# Patient Record
Sex: FEMALE | Race: WHITE | Employment: OTHER | URBAN - METROPOLITAN AREA
[De-identification: names, ages, dates, MRNs, and addresses within clinical notes are randomized per-mention and may not be internally consistent; named-entity substitution may affect disease eponyms.]

---

## 2018-06-17 ENCOUNTER — HOSPITAL ENCOUNTER (EMERGENCY)
Facility: CLINIC | Age: 83
Discharge: HOME | End: 2018-06-17
Attending: FAMILY MEDICINE | Admitting: FAMILY MEDICINE
Payer: MEDICARE

## 2018-06-17 ENCOUNTER — APPOINTMENT (OUTPATIENT)
Dept: ULTRASOUND IMAGING | Facility: CLINIC | Age: 83
End: 2018-06-17
Attending: FAMILY MEDICINE
Payer: MEDICARE

## 2018-06-17 ENCOUNTER — APPOINTMENT (OUTPATIENT)
Dept: GENERAL RADIOLOGY | Facility: CLINIC | Age: 83
End: 2018-06-17
Attending: FAMILY MEDICINE
Payer: MEDICARE

## 2018-06-17 VITALS
BODY MASS INDEX: 27.4 KG/M2 | WEIGHT: 145 LBS | RESPIRATION RATE: 16 BRPM | OXYGEN SATURATION: 98 % | HEART RATE: 68 BPM | SYSTOLIC BLOOD PRESSURE: 123 MMHG | TEMPERATURE: 98.3 F | DIASTOLIC BLOOD PRESSURE: 55 MMHG

## 2018-06-17 DIAGNOSIS — S40.022A: ICD-10-CM

## 2018-06-17 LAB
ANION GAP SERPL CALCULATED.3IONS-SCNC: 10 MMOL/L (ref 3–14)
APTT PPP: 28 SEC (ref 22–37)
BASOPHILS # BLD AUTO: 0 10E9/L (ref 0–0.2)
BASOPHILS NFR BLD AUTO: 0.2 %
BUN SERPL-MCNC: 18 MG/DL (ref 7–30)
CALCIUM SERPL-MCNC: 8.2 MG/DL (ref 8.5–10.1)
CHLORIDE SERPL-SCNC: 101 MMOL/L (ref 94–109)
CO2 SERPL-SCNC: 24 MMOL/L (ref 20–32)
CREAT SERPL-MCNC: 0.67 MG/DL (ref 0.52–1.04)
CRP SERPL-MCNC: <2.9 MG/L (ref 0–8)
DIFFERENTIAL METHOD BLD: ABNORMAL
EOSINOPHIL # BLD AUTO: 0.1 10E9/L (ref 0–0.7)
EOSINOPHIL NFR BLD AUTO: 1.5 %
ERYTHROCYTE [DISTWIDTH] IN BLOOD BY AUTOMATED COUNT: 15.6 % (ref 10–15)
ERYTHROCYTE [SEDIMENTATION RATE] IN BLOOD BY WESTERGREN METHOD: 24 MM/H (ref 0–30)
GFR SERPL CREATININE-BSD FRML MDRD: 84 ML/MIN/1.7M2
GLUCOSE SERPL-MCNC: 94 MG/DL (ref 70–99)
HCT VFR BLD AUTO: 28.2 % (ref 35–47)
HGB BLD-MCNC: 9.6 G/DL (ref 11.7–15.7)
IMM GRANULOCYTES # BLD: 0 10E9/L (ref 0–0.4)
IMM GRANULOCYTES NFR BLD: 0.4 %
INR PPP: 0.98 (ref 0.86–1.14)
LYMPHOCYTES # BLD AUTO: 0.5 10E9/L (ref 0.8–5.3)
LYMPHOCYTES NFR BLD AUTO: 9.6 %
MCH RBC QN AUTO: 36.1 PG (ref 26.5–33)
MCHC RBC AUTO-ENTMCNC: 34 G/DL (ref 31.5–36.5)
MCV RBC AUTO: 106 FL (ref 78–100)
MONOCYTES # BLD AUTO: 0.8 10E9/L (ref 0–1.3)
MONOCYTES NFR BLD AUTO: 15.4 %
NEUTROPHILS # BLD AUTO: 3.9 10E9/L (ref 1.6–8.3)
NEUTROPHILS NFR BLD AUTO: 72.9 %
NRBC # BLD AUTO: 0 10*3/UL
NRBC BLD AUTO-RTO: 0 /100
PLATELET # BLD AUTO: 291 10E9/L (ref 150–450)
POTASSIUM SERPL-SCNC: 4.3 MMOL/L (ref 3.4–5.3)
RBC # BLD AUTO: 2.66 10E12/L (ref 3.8–5.2)
SODIUM SERPL-SCNC: 135 MMOL/L (ref 133–144)
WBC # BLD AUTO: 5.3 10E9/L (ref 4–11)

## 2018-06-17 PROCEDURE — 99282 EMERGENCY DEPT VISIT SF MDM: CPT | Mod: Z6 | Performed by: FAMILY MEDICINE

## 2018-06-17 PROCEDURE — 93971 EXTREMITY STUDY: CPT | Mod: LT

## 2018-06-17 PROCEDURE — 85652 RBC SED RATE AUTOMATED: CPT | Performed by: FAMILY MEDICINE

## 2018-06-17 PROCEDURE — 85730 THROMBOPLASTIN TIME PARTIAL: CPT | Performed by: FAMILY MEDICINE

## 2018-06-17 PROCEDURE — 86140 C-REACTIVE PROTEIN: CPT | Performed by: FAMILY MEDICINE

## 2018-06-17 PROCEDURE — 99285 EMERGENCY DEPT VISIT HI MDM: CPT | Mod: 25 | Performed by: FAMILY MEDICINE

## 2018-06-17 PROCEDURE — 85610 PROTHROMBIN TIME: CPT | Performed by: FAMILY MEDICINE

## 2018-06-17 PROCEDURE — 85025 COMPLETE CBC W/AUTO DIFF WBC: CPT | Performed by: FAMILY MEDICINE

## 2018-06-17 PROCEDURE — 73030 X-RAY EXAM OF SHOULDER: CPT | Mod: LT

## 2018-06-17 PROCEDURE — 80048 BASIC METABOLIC PNL TOTAL CA: CPT | Performed by: FAMILY MEDICINE

## 2018-06-17 NOTE — ED AVS SNAPSHOT
[facility], Emergency Department    [address]    [place] [address]    Phone:  [phone]    Fax:  [phone]                                       Sarah Knott   MRN: [id number]    Department:  [facility], Emergency Department   Date of Visit:  6/17/2018           Patient Information     Date Of Birth          6/6/1933        Your diagnoses for this visit were:     Hematoma of axilla, left, initial encounter        You were seen by Buddy Medina MD.        Discharge Instructions       Thank you for choosing [facility].     Please closely monitor for further symptoms. Return to the Emergency Department if you develop any new or worsening signs or symptoms.    If you received any opiate pain medications or sedatives during your visit, please do not drive for at least 8 hours.     Labs, cultures or final xray interpretations may still need to be reviewed.  We will call you if your plan of care needs to be changed.    Please follow up with your primary care physician or clinic 3-5 days for recheck.      Discharge References/Attachments     HEMATOMA (ENGLISH)      24 Hour Appointment Hotline       To make an appointment at any [place] clinic, call [phone] ([phone]). If you don't have a family doctor or clinic, we will help you find one. [place] clinics are conveniently located to serve the needs of you and your family.             Review of your medicines      Our records show that you are taking the medicines listed below. If these are incorrect, please call your family doctor or clinic.        Dose / Directions Last dose taken    acetaminophen 500 MG tablet   Commonly known as:  TYLENOL   Dose:  500-1000 mg        Take 1-2 tablets (500-1,000 mg) by mouth every 8 hours   Refills:  0        ASPIRIN PO   Dose:  81 mg        Take 81 mg by mouth daily   Refills:  0        bisacodyl 10 MG Suppository   Commonly known as:  DULCOLAX   Dose:  10 mg    Quantity:  30 suppository        Place 1 suppository (10 mg) rectally daily as needed   Refills:  0        calcium carbonate 500 MG tablet   Commonly known as:  OS-MARIUSZ 500 mg [place]. [place]   Dose:  1200 mg        Take 1,200 mg by mouth daily   Refills:  0        COENZYME Q-10 PO   Dose:  200 mg        Take 200 mg by mouth   Refills:  0        diltiazem 240 MG 24 hr capsule   Dose:  240 mg        Take 1 capsule (240 mg) by mouth daily   Refills:  0        DILTIAZEM HCL PO   Dose:  120 mg        Take 120 mg by mouth   Refills:  0        doxycycline 100 MG capsule   Commonly known as:  VIBRAMYCIN   Dose:  100 mg        Take 100 mg by mouth daily   Refills:  0        enoxaparin 40 MG/0.4ML injection   Commonly known as:  LOVENOX   Dose:  40 mg   Quantity:  1.2 mL        Inject 0.4 mLs (40 mg) Subcutaneous every 24 hours   Refills:  0        HYDROmorphone 2 MG tablet   Commonly known as:  DILAUDID   Dose:  2-4 mg   Quantity:  60 tablet        Take 1-2 tablets (2-4 mg) by mouth every 3 hours as needed   Refills:  0        * hydroxyurea 500 MG capsule CHEMO   Commonly known as:  HYDREA        Take by mouth daily Two tablets one day alternating with one the next day   Refills:  0        * hydroxyurea 500 MG capsule CHEMO   Commonly known as:  HYDREA   Dose:  500 mg        Take 1 capsule (500 mg) by mouth every other day   Refills:  0        hydrOXYzine 10 MG tablet   Commonly known as:  ATARAX   Dose:  10 mg   Quantity:  120 tablet        Take 1 tablet (10 mg) by mouth every 6 hours as needed for itching   Refills:  0        OMEGA-3 FISH OIL PO   Dose:  1 g        Take 1 g by mouth daily   Refills:  0        senna-docusate 8.6-50 MG per tablet   Commonly known as:  SENOKOT-S;PERICOLACE   Dose:  1-2 tablet   Quantity:  7 tablet        Take 1-2 tablets by mouth 2 times daily   Refills:  0        VITAMIN D (CHOLECALCIFEROL) PO   Dose:  2000 Units        Take 2,000 Units by mouth   Refills:  0        * Notice:  This list has 2  "medication(s) that are the same as other medications prescribed for you. Read the directions carefully, and ask your doctor or other care provider to review them with you.            Procedures and tests performed during your visit     Basic metabolic panel    CBC with platelets differential    CRP inflammation    Erythrocyte sedimentation rate auto    INR    PTT    US Upper Extremity Venous Duplex Left    XR Shoulder Left G/E 3 Views      Orders Needing Specimen Collection     None      Pending Results     Date and Time Order Name Status Description    6/17/2018 1538 XR Shoulder Left G/E 3 Views Preliminary             Pending Culture Results     No orders found from 6/15/2018 to 6/18/2018.            Pending Results Instructions     If you had any lab results that were not finalized at the time of your Discharge, you can call the ED Lab Result RN at [phone]. You will be contacted by this team for any positive Lab results or changes in treatment. The nurses are available 7 days a week from 10A to 6:30P.  You can leave a message 24 hours per day and they will return your call.        Thank you for choosing [facility]       Thank you for choosing [facility] for your care. Our goal is always to provide you with excellent care. Hearing back from our patients is one way we can continue to improve our services. Please take a few minutes to complete the written survey that you may receive in the mail after you visit with us. Thank you!        [facility] Information     [facility] lets you send messages to your doctor, view your test results, renew your prescriptions, schedule appointments and more. To sign up, go to [email or web address] . Click on \"Log in\" on the left side of the screen, which will take you to the Welcome page. Then click on \"Sign up Now\" on the right side of the page.     You will be asked to enter the access code listed below, as well as some personal information. Please follow the directions to create " your username and password.     Your access code is: [email or web address]  Expires: 9/15/2018  5:25 PM     Your access code will  in 90 days. If you need help or a new code, please call your [facility] clinic or [phone].        Care EveryWhere ID     This is your Care EveryWhere ID. This could be used by other organizations to access your [facility] medical records  [id number]        Equal Access to Services     [facility] CenterMARTINA : Hadii tim gillespie hadasho Soomaali, waaxda luqadaha, qaybta kaalmada adeegyada, cinda aden . So [facility] [phone].    ATENCIÓN: Si habla español, tiene a kumar disposición servicios gratuitos de asistencia lingüística. Llame al [phone].    We comply with applicable federal civil rights laws and [place] laws. We do not discriminate on the basis of race, color, national origin, age, disability, sex, sexual orientation, or gender identity.            After Visit Summary       This is your record. Keep this with you and show to your community pharmacist(s) and doctor(s) at your next visit.

## 2018-06-17 NOTE — ED AVS SNAPSHOT
[facility], [place], Emergency Department    [address]    [place] [address]    Phone:  [phone]    Fax:  [phone]                                       Sarah Knott   MRN: [id number]    Department:  [facility], Emergency Department   Date of Visit:  6/17/2018           After Visit Summary Signature Page     I have received my discharge instructions, and my questions have been answered. I have discussed any challenges I see with this plan with the nurse or doctor.    ..........................................................................................................................................  Patient/Patient Representative Signature      ..........................................................................................................................................  Patient Representative Print Name and Relationship to Patient    ..................................................               ................................................  Date                                            Time    ..........................................................................................................................................  Reviewed by Signature/Title    ...................................................              ..............................................  Date                                                            Time

## 2018-06-17 NOTE — ED PROVIDER NOTES
"  History     Chief Complaint   Patient presents with     Skin Check     Redness to left upper arm which started Friday, has been increasing towards chest since      HPI  Sarah Knott is a 85 year old female with history of Factor V Leiden, HTN, arthritis, heart murmurs, and lyme disease who was seen at [place] [facility] on 5/25/18, 3 weeks ago, for evaluation of a mechanical fall. Per chart review, patient slipped and fell out of her bed, landing on her left shoulder. She had a left shoulder X-Ray completed which \"did not show fracture dislocation, but did show severe degenerative changes suggesting possible avascular necrosis\". She then met with her Geriatric Medicine Doctor, Dr. Alejandra White, a few days later on 5/29/18. She was given a cortisone shot and had an MRI completed on that day that confirmed severe arthritis.    Today, patient presents for evaluation of worsening discoloration in her left upper arm and left-sided chest that started on Friday. She states the area of discoloration has been spreading down her arm and the area feels warm. She is unsure of any more trauma to the area since her fall. Regarding medications, she regularly takes baby aspirin and doxycycline. She states the doxycycline was prescribed for lyme disease a long time ago, but has continued to take it to combat arthritic pain. Patient denies bleeding in her gums.     MR SHOULDER LT WO IV CONT 5/29/2018 1:37 PM  CONCLUSION:  1.  Very advanced degenerative osteoarthritic changes of the glenoid labrum with marked remodeling of the articular surface of the glenoid and marked chronic erosion of the posterior portion of the glenoid rim.    2.  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    3.  Prior subacromial decompression.    4.  Broad-based chronic partial tearing of the supraspinatus tendon resulting in moderate attenuation.    5.  Moderate tendinopathy in the infraspinatus and subscapularis " tendons.    6.  Large glenohumeral joint effusion with marked subacromial subdeltoid bursitis.  [facility] X[facility] SHOULDER 3 VIEWS LEFT 5/25/2018 10:17 AM  FINDINGS: No fracture or dislocation. There is severe degenerative change of the  glenohumeral joint with flattening and remodeling of the humeral head with  significant sclerosis suggesting possible avascular necrosis or postoperative  changes.. Postoperative changes to the distal clavicle.    Past Medical History:   Diagnosis Date     Coagulation disorder (H)      Factor V Leiden (H)      Hypertension      Lyme disease        Past Surgical History:   Procedure Laterality Date     APPENDECTOMY       ARTHROPLASTY MINIMALLY INVASIVE KNEE  9/23/2013    Procedure: ARTHROPLASTY MINIMALLY INVASIVE KNEE;  Minimally Invasive Right Total Knee Arthroplasty  latex allergy ;  Surgeon: James Oliver MD;  Location: [facility] OR     BIOPSY      breast     GYN SURGERY      hysterectomy     HYSTERECTOMY         No family history on file.    Social History   Substance Use Topics     Smoking status: Former Smoker     Quit date: 9/19/2003     Smokeless tobacco: Never Used     Alcohol use Yes      Comment: daily 2 glasses/red wine       No current facility-administered medications for this encounter.      Current Outpatient Prescriptions   Medication     ASPIRIN PO     diltiazem 240 MG 24 hr CD capsule     DILTIAZEM HCL PO     doxycycline (VIBRAMYCIN) 100 MG capsule     acetaminophen (TYLENOL) 500 MG tablet     bisacodyl (DULCOLAX) 10 MG suppository     calcium carbonate (OS-MARIUSZ 500 MG [place] [place]) 500 MG tablet     COENZYME Q-10 PO     enoxaparin (LOVENOX) 40 MG/0.4ML SOLN     HYDROmorphone (DILAUDID) 2 MG tablet     hydroxyurea (HYDREA) 500 MG capsule     hydroxyurea (HYDREA) 500 MG capsule     hydrOXYzine (ATARAX) 10 MG tablet     Omega-3 Fatty Acids (OMEGA-3 FISH OIL PO)     senna-docusate (SENOKOT-S;PERICOLACE) 8.6-50 MG per tablet     VITAMIN D, CHOLECALCIFEROL, PO      Facility-Administered Medications Ordered in Other Encounters   Medication     morphine (PF) (ASTRAMORPH / DURAMORPH) injection        Allergies   Allergen Reactions     Lisinopril Other (See Comments)     cough     Penicillins Rash         I have reviewed the Medications, Allergies, Past Medical and Surgical History, and Social History in the Epic system.    Review of Systems    ROS: 14 point ROS neg other than the symptoms noted above in the HPI.    Physical Exam   BP: 121/55  Pulse: 66  Temp: 98.3  F (36.8  C)  Resp: 16  Weight: 65.8 kg (145 lb)  SpO2: 96 %      Physical Exam   Constitutional: She is oriented to person, place, and time. She appears well-developed and well-nourished.   HENT:   Head: Normocephalic and atraumatic.   Mouth/Throat: Oropharynx is clear and moist.   Eyes: EOM are normal. Pupils are equal, round, and reactive to light.   Neck: Normal range of motion. Neck supple. No tracheal deviation present. No thyromegaly present.   Cardiovascular: Normal rate, regular rhythm, normal heart sounds and intact distal pulses.  Exam reveals no gallop and no friction rub.    No murmur heard.  Pulmonary/Chest: Effort normal and breath sounds normal. She exhibits no tenderness.   Abdominal: Soft. Bowel sounds are normal. She exhibits no distension and no mass. There is no tenderness.   Musculoskeletal: She exhibits tenderness. She exhibits no edema.        Left shoulder: She exhibits decreased range of motion, tenderness and pain. She exhibits no bony tenderness, no swelling, no effusion and normal pulse.        Arms:  Distal CMS normal on the left upper extremity   Neurological: She is alert and oriented to person, place, and time. No cranial nerve deficit. Coordination normal.   Skin: Skin is warm and dry. No rash noted.   Psychiatric: She has a normal mood and affect. Her behavior is normal.   Nursing note and vitals reviewed.          ED Course     ED Course     Procedures       3:28 PM  The patient  was seen and examined by Dr. Medina in Room 19.          Critical Care time:  none             Labs Ordered and Resulted from Time of ED Arrival Up to the Time of Departure from the ED   CBC WITH PLATELETS DIFFERENTIAL - Abnormal; Notable for the following:        Result Value    RBC Count 2.66 (*)     Hemoglobin 9.6 (*)     Hematocrit 28.2 (*)      (*)     MCH 36.1 (*)     RDW 15.6 (*)     Absolute Lymphocytes 0.5 (*)     All other components within normal limits   BASIC METABOLIC PANEL - Abnormal; Notable for the following:     Calcium 8.2 (*)     All other components within normal limits   INR   PARTIAL THROMBOPLASTIN TIME   ERYTHROCYTE SEDIMENTATION RATE AUTO   CRP INFLAMMATION            Assessments & Plan (with Medical Decision Making)   Patient presenting with an area of ecchymosis and discoloration which extends her left axilla all the way down her left bicep and into her medial forearm.  She initially reported no trauma, but subsequently we discussed that she had a fall with the details as outlined above followed by a joint injection and an MRI, the results of which are in the HPI above.  Differential diagnosis of her worsening area of ecchymosis, swelling and discoloration includes hematoma, cellulitis, DVT, abscess, necrotizing fasciitis, lymphangitis, coagulopathy, compartment syndrome.  On exam she has normal vital signs, appears in no distress.  She does have the area of discoloration described above as well as visualized in the photo above.  There are no signs of compartment syndrome.  There is some subtle warmth around the discolored area but it is not hot or erythematous.  There is certainly no sign of abscess or necrotizing fasciitis.  No signs of compartment syndrome.  Though she is anemic, her hemoglobin appears stable and at baseline.  Her other labs including white blood cell count and inflammatory markers are normal.  Her x-ray shows severe degenerative change prior films but no acute  fracture or dislocation.  Clinically, this scenario fits best with a traumatic hematoma which is now in varying stages of resolution.  We discussed the prognosis and expected clinical course as well as what limited measures can be taken to manage this.  I feel she is stable to be discharged home and can follow-up with her primary physician for a recheck.  Discussed expected course, need for follow up, and indications for return with the patient.  See discharge instructions.    I have reviewed the nursing notes.    I have reviewed the findings, diagnosis, plan and need for follow up with the patient.    New Prescriptions    No medications on file       Final diagnoses:   Hematoma of axilla, left, initial encounter   I, Des Cabrera, am serving as a trained medical scribe to document services personally performed by Vineet Medina MD, based on the provider's statements to me.   I, Vineet Medina MD, was physically present and have reviewed and verified the accuracy of this note documented by Des Cabrera.      6/17/2018   [facility], [place], EMERGENCY DEPARTMENT     Buddy Medina MD  06/17/18 [id number]

## 2018-06-17 NOTE — DISCHARGE INSTRUCTIONS
Thank you for choosing [facility].     Please closely monitor for further symptoms. Return to the Emergency Department if you develop any new or worsening signs or symptoms.    If you received any opiate pain medications or sedatives during your visit, please do not drive for at least 8 hours.     Labs, cultures or final xray interpretations may still need to be reviewed.  We will call you if your plan of care needs to be changed.    Please follow up with your primary care physician or clinic 3-5 days for recheck.

## 2018-06-17 NOTE — ED TRIAGE NOTES
Patient c/o redness to left upper arm which has been getting worse.  Patient c/o pressure sensation in the arm, area of redness warm to touch also.